# Patient Record
Sex: FEMALE | Race: WHITE | Employment: FULL TIME | URBAN - METROPOLITAN AREA
[De-identification: names, ages, dates, MRNs, and addresses within clinical notes are randomized per-mention and may not be internally consistent; named-entity substitution may affect disease eponyms.]

---

## 2021-09-28 ENCOUNTER — DOCUMENTATION ONLY (OUTPATIENT)
Dept: SLEEP MEDICINE | Age: 46
End: 2021-09-28

## 2021-09-28 ENCOUNTER — VIRTUAL VISIT (OUTPATIENT)
Dept: SLEEP MEDICINE | Age: 46
End: 2021-09-28

## 2021-09-28 DIAGNOSIS — R00.2 PALPITATIONS: ICD-10-CM

## 2021-09-28 DIAGNOSIS — G47.33 OBSTRUCTIVE SLEEP APNEA (ADULT) (PEDIATRIC): Primary | ICD-10-CM

## 2021-09-28 DIAGNOSIS — I10 ESSENTIAL HYPERTENSION, BENIGN: ICD-10-CM

## 2021-09-28 PROCEDURE — 99203 OFFICE O/P NEW LOW 30 MIN: CPT | Performed by: INTERNAL MEDICINE

## 2021-09-28 NOTE — PROGRESS NOTES
[address] [facility] [place]. [place], [address]  Tel.  [phone]  Fax. 100 [facility] 60  [place], [address] [facility]  Tel.  [phone]  Fax. [phone] 9250 [facility] Amy [place]  Tel.  [phone]  Fax. [phone]         Subjective:        Telemedicine visit performed with verbal consent of the patient. Patient called and identity confirmed with 2 patient identifers    Patient was seen at work  Roxanne Motta is a 55 y.o. female who was seen by synchronous (real-time) audio-video technology on 9/28/2021. Consent:  She and/or her healthcare decision maker is aware that this patient-initiated Telehealth encounter is the equivalent to a face to face encounter in the sleep disorder center and has provided verbal consent to proceed: Yes    I was in the office while conducting this encounter. Roxanne Motta is an 55 y.o. female self-referred for evaluation for a sleep disorder. She complains of needing a ndw CPAP associated with hers is over 11years old and has been recalled. She has snoring and apna without PAP. She was seen over 5 years ago and had a HSAT showing severe sleep apne (AHI 43/hour). She uses PAP nightly and feels it improves her sleep quality. She was concerned because she had episodes where her watch was reading fast as high as 100-120 and said A-fib at one point. She has anxiety and thought it may be related to it. She has had a holter years ago and was found to be negative. .  Symptoms began several weeks ago, unchanged since that time. She usually can fall asleep in 10 minutes. Family or house members note no obvious snoring with PAP. She denies falling asleep while at work, driving. Roxanne Motta does not wake up frequently at night. She   bothered by waking up too early and left unable to get back to sleep. She actually sleeps about 7 hours at night and wakes up about 1 times during the night.  She does not work shifts: Richard Gibson indicates she does not get too little sleep at night. Her bedtime is 1100. She awakens at 0700. She does not take naps. She takes   naps a week lasting  . She has the following observed behaviors: Not applicable;  . Other remarks:    She is a therapist   Download shows compliance at 100%   Setting 5-12 cm  Burt Sleepiness Score: 7     Allergies   Allergen Reactions    Other Food Anaphylaxis     Brie cheese    Peanut Anaphylaxis     Sesame seads    Flaxseed Itching    Other Medication Rash     Laytex,  bananas     Shellfish Derived Swelling    Sulfa (Sulfonamide Antibiotics) Other (comments)     Mouth and tongue tingling     Voltaren [Diclofenac Sodium] Other (comments)     Leg swelling, rash         Current Outpatient Medications:     glucose blood VI test strips (ASCENSIA AUTODISC VI, ONE TOUCH ULTRA TEST VI) strip, Patient requires One Touch Ultra Blue Strips to check blood sugars daily E11.9 Please provide 30 day supply, Disp: 100 Strip, Rfl: 1    cpap machine kit, by Does Not Apply route., Disp: , Rfl:     PROAIR HFA 90 mcg/actuation inhaler, 90 mcg., Disp: , Rfl:     vitamin c-vitamin e cap, Take  by mouth., Disp: , Rfl:     amLODIPine-Olmesartan 10-40 mg tab, Take  by mouth., Disp: , Rfl:     b complex vitamins tablet, Take 1 Tab by mouth daily. , Disp: , Rfl:     Methen-Hyos-M Blue-BA-PhSal (PROSED/DS, ATROPINE FREE,) tablet, Take 1 Tab by mouth daily. , Disp: 30 Tab, Rfl: 6    LACTOBACILLUS ACIDOPHILUS (PROBIOTIC PO), Take  by mouth daily. , Disp: , Rfl:     meclizine (ANTIVERT) 25 mg tablet, Take  by mouth three (3) times daily as needed. , Disp: , Rfl:     MILK THISTLE PO, Take  by mouth daily. , Disp: , Rfl:     famotidine (PEPCID) 20 mg tablet, Take 20 mg by mouth two (2) times a day., Disp: , Rfl:     budesonide (RHINOCORT AQUA) 32 mcg/actuation nasal spray, 2 Sprays by Both Nostrils route daily. , Disp: 1 Bottle, Rfl: 3    clonazepam (KLONOPIN) 0.5 mg tablet, Take  by mouth nightly as needed. , Disp: , Rfl:     loratadine (CLARITIN) 10 mg tablet, Take 10 mg by mouth daily. , Disp: , Rfl:     Lancets misc, Pt to test daily, Disp: 1 Package, Rfl: 11    lamoTRIgine (LAMICTAL) 150 mg tablet, Take  by mouth daily. , Disp: , Rfl:     EPIPEN 2-SVETLANA 0.3 mg/0.3 mL (1:1,000) injection, INJECT 0.3ML (ONE PEN) BY INTRAMUSCULAR    ROUTE ONCEAS NEEDED FOR ONE DOSE, Disp: 2 mL, Rfl: 0    EPINEPHrine (EPIPEN) 0.3 mg/0.3 mL injection, 0.3 mL by IntraMUSCular route once as needed for 1 dose., Disp: 2 Each, Rfl: 1    MULTIVITAMINS (MULTIPLE VITAMIN PO), Take  by mouth., Disp: , Rfl:     fluoxetine (PROZAC) 20 mg capsule, take 1 Cap by mouth daily. , Disp: , Rfl:     hydroCHLOROthiazide (HYDRODIURIL) 25 mg tablet, Take 1 Tab by mouth daily for 30 days. , Disp: 30 Tab, Rfl: 0    levothyroxine (SYNTHROID) 25 mcg tablet, Take 1 Tab by mouth Daily (before breakfast) for 30 days. , Disp: 30 Tab, Rfl: 5    hydrochlorothiazide (HYDRODIURIL) 25 mg tablet, Take 1 Tab by mouth daily for 30 days. , Disp: 30 Tab, Rfl: 3    levothyroxine (SYNTHROID) 25 mcg tablet, Take 1 Tab by mouth Daily (before breakfast) for 30 days. , Disp: 30 Tab, Rfl: 5    levothyroxine (SYNTHROID) 25 mcg tablet, Take 1 Tab by mouth Daily (before breakfast) for 30 days. , Disp: 30 Tab, Rfl: 4     She  has a past medical history of Bipolar 2 disorder ([facility] (9/9/2009), Essential hypertension, benign (9/9/2009), Essential hypertension, benign (9/9/2009), Other and unspecified hyperlipidemia (9/9/2009), and Type 2 diabetes mellitus without complication ([facility]  She also has no past medical history of Abuse, Adult physical abuse, Anemia NEC, Arrhythmia, Arthritis, Asthma, Autoimmune disease ([facility] CAD (coronary artery disease), Calculus of kidney, Cancer ([address] Chronic kidney disease, Chronic pain, Congestive heart failure, unspecified, Contact dermatitis and other eczema, due to unspecified cause, COPD, Depression, GERD (gastroesophageal reflux disease), Headache(784.0), Hypercholesteremia, Liver disease, Psychotic disorder ([facility] PUD (peptic ulcer disease), Seizures ([facility] [address] [facility] Stroke ([facility] Thromboembolus ([facility] or Thyroid disease. She  has a past surgical history that includes pr breast surgery procedure unlisted. She family history includes Diabetes in her father, paternal grandfather, and paternal grandmother; Heart Disease in her maternal grandfather and paternal grandfather; Hypertension in her father, maternal grandmother, paternal grandfather, and paternal grandmother; Stroke in her paternal grandmother. She  reports that she has never smoked. She has never used smokeless tobacco. She reports that she does not drink alcohol and does not use drugs.      Review of Systems:  Constitutional:  10 pound weight loss  Eyes:  No blurred vision  CVS:  No significant chest pain  Pulm:  No significant shortness of breath  GI:  No significant nausea or vomiting  :  No significant nocturia  Musculoskeletal: some recent joint pain at night  Skin:  No significant rashes  Neuro:  No significant dizziness   Psych:  Treated for anxiety    Sleep Review of Systems: notable for no difficulty falling asleep; infrequent awakenings at night;  rare dreaming noted; no nightmares ; no early morning headaches; +chronic memory problems    Objective:     Weight 247 lb  Vital Signs: (As obtained by patient/caregiver at home)        Constitutional: [x] Appears well-developed and well-nourished [x] No apparent distress      [] Abnormal -     Mental status: [x] Alert and awake  [x] Oriented to person/place/time [x] Able to follow commands    [] Abnormal -     Eyes:   EOM    [x]  Normal    [] Abnormal -   Sclera  [x]  Normal    [] Abnormal -          Discharge [x]  None visible   [] Abnormal -     HENT: [x] Normocephalic, atraumatic  [] Abnormal -   [x] Mouth/Throat: Mucous membranes are moist             External Ears [x] Normal  [] Abnormal -    Neck: [x] No visualized mass [] Abnormal -     Pulmonary/Chest: [x] Respiratory effort normal   [x] No visualized signs of difficulty breathing or respiratory distress        [] Abnormal -       Neurological:        [x] No Facial Asymmetry (Cranial nerve 7 motor function) (limited exam due to video visit)          [x] No gaze palsy        [] Abnormal -          Skin:        [x] No significant exanthematous lesions or discoloration noted on facial skin         [] Abnormal -            Psychiatric:       [x] Normal Affect [] Abnormal -       Other pertinent observable physical exam findings:-          Assessment:       ICD-10-CM ICD-9-CM    1. Obstructive sleep apnea (adult) (pediatric)  G47.33 327.23 AMB SUPPLY ORDER   2. Palpitations  R00.2 785.1    3. Essential hypertension, benign  I10 401.1          Plan:       *Treatment options for sleep apnea were reviewed. she is compliant with PAP therapy and PAP continues to benefit patient and remains necessary for control of her sleep apnea. Patient's PAP device has exceeded its  Lifespan. Replacement device ordered. I reviewed the [employer] recall. We discussed the problem of possible sound abatement foam breakdown. she does not use a So-clean device or other commercial PAP . she understands the use of those devices may increase likelihood of the foam breakdown. she was advised of the 's recommendation to stop use of these PAP devices until the problem is rectified. We discussed the pros and cons of withholding PAP therapy. she was informed that [employer] is working on a solution and updating their website daily. she was encouraged to check the site daily. she was advised to get on the recall register. Should she decide to discontinue PAP device, she should sleep with head of bed elevated or avoid sleeping on her back to help minimize respiratory events.   she was advised that if/when she receives the replacement PAP device from [employer], she should contact his medical supply company so they can set the PAP to her previous settings and mayra the [facility] access to the modem. Previous hose and mask should be compatible with the new device. * Counseling was provided regarding proper sleep hygiene, appropriate sleep schedule, need for sleep environment safety and safe driving. * Effect of sleep disturbance on weight was reviewed. We have recommended a dedicated weight loss through appropriate diet and an exercise regimen as significant weight reduction has been shown to reduce severity of obstructive sleep apnea. * Patient agrees to telephone follow-up by sleep technologist shortly after sleep study to review results and plan final management. 2. Palpitations- I advised her to let her PMD know about high heart rate or a-fib readings from [facility]. They may want to repeat a Holter monitor. 3. Hypertension - she continues on her current regimen. I have reviewed the relationship between hypertension as it relates to sleep-disordered breathing. . All of her questions were addressed. Pursuant to the emergency declaration under the [facility], 1135 waiver authority and the [employer] Act, this Virtual  Visit was conducted, with patient's consent, to reduce the patient's risk of exposure to COVID-19 and provide continuity of care for an established patient. Services were provided through a video synchronous discussion virtually to substitute for in-person clinic visit.     Meg Vance MD    Electronically signed by    Aide Betancourt MD  Diplomate in Sleep Medicine  [facility]

## 2021-09-28 NOTE — PATIENT INSTRUCTIONS
[address] [facility] [address] [place]. [place], [address]  Tel.  [phone]  Fax. 100 [facility] 60  [place], [address]  Tel.  [phone]  Fax. [phone] 9250 [facility] Amy Brown  Tel.  [phone]  Fax. [phone]     PROPER SLEEP HYGIENE    What to avoid  · Do not have drinks with caffeine, such as coffee or black tea, for 8 hours before bed. · Do not smoke or use other types of tobacco near bedtime. Nicotine is a stimulant and can keep you awake. · Avoid drinking alcohol late in the evening, because it can cause you to wake in the middle of the night. · Do not eat a big meal close to bedtime. If you are hungry, eat a light snack. · Do not drink a lot of water close to bedtime, because the need to urinate may wake you up during the night. · Do not read or watch TV in bed. Use the bed only for sleeping and sexual activity. What to try  · Go to bed at the same time every night, and wake up at the same time every morning. Do not take naps during the day. · Keep your bedroom quiet, dark, and cool. · Get regular exercise, but not within 3 to 4 hours of your bedtime. .  · Sleep on a comfortable pillow and mattress. · If watching the clock makes you anxious, turn it facing away from you so you cannot see the time. · If you worry when you lie down, start a worry book. Well before bedtime, write down your worries, and then set the book and your concerns aside. · Try meditation or other relaxation techniques before you go to bed. · If you cannot fall asleep, get up and go to another room until you feel sleepy. Do something relaxing. Repeat your bedtime routine before you go to bed again. · Make your house quiet and calm about an hour before bedtime. Turn down the lights, turn off the TV, log off the computer, and turn down the volume on music. This can help you relax after a busy day.     Drowsy Driving  The [address] [place] [address] Road Traffic Safety Administration cites drowsiness as a causing factor in more than 588,562 police reported crashes annually, resulting in 76,000 injuries and 1,500 deaths. Other surveys suggest 55% of people polled have driven while drowsy in the past year, 23% had fallen asleep but not crashed, 3% crashed, and 2% had and accident due to drowsy driving. Who is at risk? Young Drivers: One study of drowsy driving accidents states that 55% of the drivers were under 25 years. Of those, 75% were male. Shift Workers and Travelers: People who work overnight or travel across time zones frequently are at higher risk of experiencing Circadian Rhythm Disorders. They are trying to work and function when their body is programed to sleep. Sleep Deprived: Lack of sleep has a serious impact on your ability to pay attention or focus on a task. Consistently getting less than the average of 8 hours your body needs creates partial or cumulative sleep deprivation. Untreated Sleep Disorders: Sleep Apnea, Narcolepsy, R.L.S., and other sleep disorders (untreated) prevent a person from getting enough restful sleep. This leads to excessive daytime sleepiness and increases the risk for drowsy driving accidents by up to 7 times. Medications / Alcohol: Even over the counter medications can cause drowsiness. Medications that impair a drivers attention should have a warning label. Alcohol naturally makes you sleepy and on its own can cause accidents. Combined with excessive drowsiness its effects are amplified. Signs of Drowsy Driving:   * You don't remember driving the last few miles   * You may drift out of your curtis   * You are unable to focus and your thoughts wander   * You may yawn more often than normal   * You have difficulty keeping your eyes open / nodding off   * Missing traffic signs, speeding, or tailgating  Prevention-   Good sleep hygiene, lifestyle and behavioral choices have the most impact on drowsy driving.  There is no substitute for sleep and the average person requires 8 hours nightly. If you find yourself driving drowsy, stop and sleep. Consider the sleep hygiene tips provided during your visit as well. Medication Refill Policy: Refills for all medications require 1 week advance notice. Please have your pharmacy fax a refill request. We are unable to fax, or call in \"controled substance\" medications and you will need to pick these prescriptions up from our office. [employer] Activation    Thank you for requesting access to [employer]. Please follow the instructions below to securely access and download your online medical record. [employer] allows you to send messages to your doctor, view your test results, renew your prescriptions, schedule appointments, and more. How Do I Sign Up? 1. In your internet browser, go to [email or web address]. 2. Click on the First Time User? Click Here link in the Sign In box. You will see the New Member Sign Up page. 3. Enter your [employer] Access Code exactly as it appears below. You will not need to use this code after youve completed the sign-up process. If you do not sign up before the expiration date, you must request a new code. [employer] Access Code: Activation code not generated  Current [employer] Status: Active (This is the date your [employer] access code will )    4. Enter the last four digits of your Social Security Number (xxxx) and Date of Birth (mm/dd/yyyy) as indicated and click Submit. You will be taken to the next sign-up page. 5. Create a [employer] ID. This will be your [employer] login ID and cannot be changed, so think of one that is secure and easy to remember. 6. Create a [employer] password. You can change your password at any time. 7. Enter your Password Reset Question and Answer. This can be used at a later time if you forget your password. 8. Enter your e-mail address. You will receive e-mail notification when new information is available in [address]   9. Click Sign Up. You can now view and download portions of your medical record. 10. Click the Download Summary menu link to download a portable copy of your medical information. Additional Information    If you have questions, please call [phone]. Remember, [employer] is NOT to be used for urgent needs. For medical emergencies, dial 911.